# Patient Record
Sex: MALE | URBAN - METROPOLITAN AREA
[De-identification: names, ages, dates, MRNs, and addresses within clinical notes are randomized per-mention and may not be internally consistent; named-entity substitution may affect disease eponyms.]

---

## 2018-03-01 ENCOUNTER — OUTPATIENT (OUTPATIENT)
Dept: OUTPATIENT SERVICES | Facility: HOSPITAL | Age: 33
LOS: 1 days | End: 2018-03-01
Payer: MEDICAID

## 2018-03-01 PROCEDURE — G9001: CPT

## 2018-03-06 DIAGNOSIS — R69 ILLNESS, UNSPECIFIED: ICD-10-CM

## 2021-02-22 ENCOUNTER — EMERGENCY (EMERGENCY)
Facility: HOSPITAL | Age: 36
LOS: 1 days | Discharge: HOME | End: 2021-02-22
Attending: EMERGENCY MEDICINE
Payer: COMMERCIAL

## 2021-02-22 VITALS
DIASTOLIC BLOOD PRESSURE: 73 MMHG | HEART RATE: 130 BPM | TEMPERATURE: 97 F | RESPIRATION RATE: 16 BRPM | OXYGEN SATURATION: 93 % | SYSTOLIC BLOOD PRESSURE: 124 MMHG

## 2021-02-22 PROCEDURE — 99053 MED SERV 10PM-8AM 24 HR FAC: CPT

## 2021-02-22 PROCEDURE — 29105 APPLICATION LONG ARM SPLINT: CPT | Mod: LT

## 2021-02-22 PROCEDURE — 99285 EMERGENCY DEPT VISIT HI MDM: CPT | Mod: 25

## 2021-02-23 VITALS
HEART RATE: 105 BPM | TEMPERATURE: 98 F | RESPIRATION RATE: 18 BRPM | DIASTOLIC BLOOD PRESSURE: 83 MMHG | SYSTOLIC BLOOD PRESSURE: 124 MMHG | OXYGEN SATURATION: 96 %

## 2021-02-23 DIAGNOSIS — Z94.0 KIDNEY TRANSPLANT STATUS: Chronic | ICD-10-CM

## 2021-02-23 PROCEDURE — 96372 THER/PROPH/DIAG INJ SC/IM: CPT | Mod: XU

## 2021-02-23 PROCEDURE — 72125 CT NECK SPINE W/O DYE: CPT | Mod: 26

## 2021-02-23 PROCEDURE — 70450 CT HEAD/BRAIN W/O DYE: CPT

## 2021-02-23 PROCEDURE — 72125 CT NECK SPINE W/O DYE: CPT

## 2021-02-23 PROCEDURE — 73080 X-RAY EXAM OF ELBOW: CPT | Mod: 26,LT

## 2021-02-23 PROCEDURE — 71046 X-RAY EXAM CHEST 2 VIEWS: CPT | Mod: 26

## 2021-02-23 PROCEDURE — 73090 X-RAY EXAM OF FOREARM: CPT | Mod: 26,LT

## 2021-02-23 PROCEDURE — 73080 X-RAY EXAM OF ELBOW: CPT

## 2021-02-23 PROCEDURE — 90715 TDAP VACCINE 7 YRS/> IM: CPT

## 2021-02-23 PROCEDURE — 29125 APPL SHORT ARM SPLINT STATIC: CPT | Mod: LT

## 2021-02-23 PROCEDURE — 71046 X-RAY EXAM CHEST 2 VIEWS: CPT

## 2021-02-23 PROCEDURE — 70450 CT HEAD/BRAIN W/O DYE: CPT | Mod: 26

## 2021-02-23 PROCEDURE — 73090 X-RAY EXAM OF FOREARM: CPT

## 2021-02-23 PROCEDURE — 90471 IMMUNIZATION ADMIN: CPT

## 2021-02-23 PROCEDURE — 99284 EMERGENCY DEPT VISIT MOD MDM: CPT | Mod: 25

## 2021-02-23 RX ORDER — KETOROLAC TROMETHAMINE 30 MG/ML
30 SYRINGE (ML) INJECTION ONCE
Refills: 0 | Status: DISCONTINUED | OUTPATIENT
Start: 2021-02-23 | End: 2021-02-23

## 2021-02-23 RX ORDER — TETANUS TOXOID, REDUCED DIPHTHERIA TOXOID AND ACELLULAR PERTUSSIS VACCINE, ADSORBED 5; 2.5; 8; 8; 2.5 [IU]/.5ML; [IU]/.5ML; UG/.5ML; UG/.5ML; UG/.5ML
0.5 SUSPENSION INTRAMUSCULAR ONCE
Refills: 0 | Status: COMPLETED | OUTPATIENT
Start: 2021-02-23 | End: 2021-02-23

## 2021-02-23 RX ORDER — ACETAMINOPHEN 500 MG
650 TABLET ORAL ONCE
Refills: 0 | Status: COMPLETED | OUTPATIENT
Start: 2021-02-23 | End: 2021-02-23

## 2021-02-23 RX ORDER — OXYCODONE AND ACETAMINOPHEN 5; 325 MG/1; MG/1
1 TABLET ORAL ONCE
Refills: 0 | Status: DISCONTINUED | OUTPATIENT
Start: 2021-02-23 | End: 2021-02-23

## 2021-02-23 RX ADMIN — Medication 650 MILLIGRAM(S): at 01:29

## 2021-02-23 RX ADMIN — Medication 30 MILLIGRAM(S): at 01:29

## 2021-02-23 RX ADMIN — TETANUS TOXOID, REDUCED DIPHTHERIA TOXOID AND ACELLULAR PERTUSSIS VACCINE, ADSORBED 0.5 MILLILITER(S): 5; 2.5; 8; 8; 2.5 SUSPENSION INTRAMUSCULAR at 01:29

## 2021-02-23 RX ADMIN — OXYCODONE AND ACETAMINOPHEN 1 TABLET(S): 5; 325 TABLET ORAL at 03:22
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
## 2021-02-23 NOTE — ED PROVIDER NOTE - PATIENT PORTAL LINK FT
You can access the FollowMyHealth Patient Portal offered by [facility] by registering at the following website: [email or web address]. By joining [facility]’s FollowMyHealth portal, you will also be able to view your health information using other applications (apps) compatible with our system.

## 2021-02-23 NOTE — ED PROCEDURE NOTE - CPROC ED POST PROC CARE GUIDE1
Verbal/written post procedure instructions were given to patient/caregiver./Instructed patient/caregiver to follow-up with primary care physician./Keep the cast/splint/dressing clean and dry. Verbal/written post procedure instructions were given to patient/caregiver./Instructed patient/caregiver to follow-up with primary care physician./Elevate the injured extremity as instructed./Keep the cast/splint/dressing clean and dry.

## 2021-02-23 NOTE — ED PROCEDURE NOTE - CPROC ED POST PROC CARE GUIDE1
Verbal/written post procedure instructions were given to patient/caregiver./Instructed patient/caregiver to follow-up with primary care physician. Verbal/written post procedure instructions were given to patient/caregiver./Instructed patient/caregiver to follow-up with primary care physician./Instructed patient/caregiver regarding signs and symptoms of infection./Elevate the injured extremity as instructed./Keep the cast/splint/dressing clean and dry.

## 2021-02-23 NOTE — ED PROVIDER NOTE - NSFOLLOWUPCLINICS_GEN_ALL_ED_FT
[facility]  Orthopedics  [address] [place] [address]  Phone: [phone]  Fax: [phone]  Follow Up Time:

## 2021-02-23 NOTE — ED PROVIDER NOTE - NSFOLLOWUPINSTRUCTIONS_ED_ALL_ED_FT
Please follow up with the orthopedic doctor as soon as possible. Please return to the Emergency Department for worsening signs or symptoms.      Ulnar Fracture       An ulnar fracture is a break in the ulna bone. The ulna is a bone in the forearm, on the same side as the little finger. The forearm is the part of the arm that is between the elbow and the wrist. It is made up of two bones: the radius and the ulna. You can feel the ulna on the outside of the wrist and at the point of the elbow.    An ulnar fracture can happen near the wrist, near the elbow, or in the middle of the forearm. In many cases of ulnar fracture, the radius is also fractured.      What are the causes?  This condition is usually caused by a direct hit or stress to the forearm. This may result from:  •An accident, such as a car or bike accident.      •Falling with the arm outstretched.        What increases the risk?  You may be more likely to fracture your ulna if you:  •Play contact sports.      •Have a condition that causes your bones to become thin and brittle (osteoporosis).        What are the signs or symptoms?  Signs and symptoms may include:  •Pain immediately after the injury.      •An abnormal bend or bump in the arm (deformity).      •Swelling.      •Bruising.      •Numbness or weakness in your hand.      •Inability to turn your hand from side to side.        How is this diagnosed?  This condition may be diagnosed based on:  •Your symptoms and medical history.      •A physical exam.      •An X-ray.        How is this treated?  Treatment depends on how severe your fracture is, where it is, and how the pieces of the broken bone line up with each other (alignment).  •The first step in treatment may be for you to wear a temporary splint for a few days. After the swelling goes down, you may get a cast, get a different type of splint, or have surgery.      •If your broken bone is in good alignment, you will need to wear a splint or cast for several weeks.    •If your broken bone is not aligned (is displaced), your health care provider will need to align the bone pieces. After alignment, you will need to wear a splint or cast for up to 6 weeks. To align your broken bone, your health care provider may:  •Move the bones back into position without surgery (closed reduction).      •Perform surgery to align the fracture and fix the bone pieces into place with metal screws, plates, or wires (open reduction and internal fixation, ORIF).      •Perform surgery to align the fracture and fix the bone pieces into place with pins that are attached to a stabilizing bar outside your skin (external fixation).      Treatment may also include:  •Having your cast changed after 2–3 weeks.      •Physical therapy.      •Follow-up visits and X-rays to make sure you are healing.        Follow these instructions at home:    If you have a splint:     •Wear it as told by your health care provider. Remove it only as told by your health care provider.      •Loosen the splint if your fingers tingle, become numb, or turn cold and blue.      •Keep the splint clean and dry.      If you have a cast:     • Do not stick anything inside the cast to scratch your skin. Doing that increases your risk for infection.      •Check the skin around the cast every day. Tell your health care provider about any concerns.      •You may put lotion on dry skin around the edges of the cast. Do not put lotion on the skin underneath the cast.      •Keep the cast clean and dry.      Bathing     • Do not take baths, swim, or use a hot tub until your health care provider approves. Ask your health care provider if you may take showers. You may only be allowed to take sponge baths.    •If your splint or cast is not waterproof:  •Do not let it get wet.      •Cover it with a watertight covering when you take a bath or a shower.        Activity     • Do not lift anything with your injured arm.      • Do not use the injured arm to support your body weight until your health care provider says that you can.      •Ask your health care provider what activities are safe for you during recovery, and ask what activities you need to avoid.        Managing pain, stiffness, and swelling    •If directed, put ice on painful areas:  •If you have a removable splint, remove it as told by your health care provider.      •Put ice in a plastic bag.      •Place a towel between your skin and the bag, or between your cast and the bag.      •Leave the ice on for 20 minutes, 2–3 times a day.        •Move your fingers often to avoid stiffness and to lessen swelling.      •Raise (elevate) the injured area above the level of your heart while you are sitting or lying down.      General instructions     • Do not put pressure on any part of the cast or splint until it is fully hardened. This may take several hours.      •Take over-the-counter and prescription medicines only as told by your health care provider.      • Do not drive until your health care provider approves. You should not drive or use heavy machinery while taking prescription pain medicine.      • Do not use any products that contain nicotine or tobacco, such as cigarettes and e-cigarettes. These can delay bone healing. If you need help quitting, ask your health care provider.       •Keep all follow-up visits as told by your health care provider. This is important.        Contact a health care provider if you have:    •Pain that does not get better with medicine.      •Swelling that gets worse.      •A bad smell coming from your cast.        Get help right away if:    •You cannot move your fingers.      •You have severe pain.    •Your fingers or your hand:  •Become numb, cold, or pale.      •Turn a bluish color.          Summary    •An ulnar fracture is a break in the ulnar bone, which is the bone in your forearm that is on the same side as your little finger.      •You may need to wear a splint or a cast for at least several weeks. Sometimes surgery is needed.      •Keep all follow-up visits as told by your health care provider.      This information is not intended to replace advice given to you by your health care provider. Make sure you discuss any questions you have with your health care provider.

## 2021-02-23 NOTE — ED PROCEDURE NOTE - CPROC ED TIME OUT STATEMENT1
“Patient's name, , procedure and correct site were confirmed during the [place] Timeout.”
“Patient's name, , procedure and correct site were confirmed during the [place] Timeout.”

## 2021-02-23 NOTE — ED PROVIDER NOTE - MUSCULOSKELETAL, MLM
Diffuse swelling to the L elbow, large abrasion to the L lateral aspect of the forearm, neurovascularly intact, no C spine tenderness
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
## 2021-03-04 PROBLEM — Z00.00 ENCOUNTER FOR PREVENTIVE HEALTH EXAMINATION: Status: ACTIVE | Noted: 2021-03-04

## 2021-03-05 ENCOUNTER — APPOINTMENT (OUTPATIENT)
Dept: ORTHOPEDIC SURGERY | Facility: CLINIC | Age: 36
End: 2021-03-05
Payer: COMMERCIAL

## 2021-03-05 PROCEDURE — 73080 X-RAY EXAM OF ELBOW: CPT | Mod: LT

## 2021-03-05 PROCEDURE — 99204 OFFICE O/P NEW MOD 45 MIN: CPT

## 2021-03-05 PROCEDURE — 99072 ADDL SUPL MATRL&STAF TM PHE: CPT

## 2021-03-05 NOTE — PHYSICAL EXAM
[de-identified] : Physical Examination\par General: well nourished, in no acute distress, alert and oriented to person, place and time\par Psychiatric: normal mood and affect, no abnormal movements or speech patterns\par Eyes: vision intact - glasses\par Throat: no thyromegaly\par Lymph: no enlarged nodes, no lymphedema in extremity\par Respiratory: no wheezing, no shortness of breath with ambulation\par Cardiac: no cardiac leg swelling, 2+ peripheral pulses\par Neurology: normal gross sensation in extremities to light touch\par Abdomen: soft, non-tender, tympanic, no masses\par \par Musculoskeletal Examination\par Cervical spine		Full painless range of motion and negative Spurling's test\par Limited exam left upper extremity secondary to fracture\par Elbow     			Right			Left\par Appearance\par      Skin 				normal			dialysis access humerus\par      Swelling/Deformity		normal			normal\par  Range of Motion\par      Flexion			160			160&\par      Extension			0			0&\par      Supination			85			85&\par      Pronation			90			90&\par Palpation\par      Radial Head			-			-\par      Lateral Epicondyle		-			-\par      Medial Epicondyle		-			-\par      Olecranon			-			+\par      Triceps Tendon		-			-\par      Biceps Tendon		-			-\par \par Strength Examination\par      Flexion 			5+ / 0			5+ / 0&\par      Extension			5+ / 0			5+ / 0&\par      Supination			5+ / 0			5+ / 0&\par      Pronation			5+ / 0			5+ / 0&\par      				normal			normal\par \par Special Examination\par      Milking Posterolateral		-			-&\par      Chair Rise Posteromedial 	-			-&\par      Ulnar Cubital Tunnel Tinnels	-			-&\par      Sublux Ulnar Nerve		-			-&\par      \par Sensation\par      Axillary			normal			normal\par      LatAntCubBrach 		normal			normal\par      Median 			normal			normal\par      Ulnar 			normal			normal\par      Radial 			normal			normal\par Motor\par      AIN 				normal			normal\par      Ulnar 			normal			normal\par      Radial 			normal			normal\par      PIN 				normal			normal\par Pulses\par      Radial			2+			2+\par  [de-identified] : 3 views of the L elbow (AP, lateral and radial head)\par were ordered, obtained and evaluated by myself today and\par demonstrate:\par The radial head aligns with the capitellum on all views\par no degenerative joint disease\par + Proximal ulna fracture minimally displaced with mild dorsal angulation\par - dislocation \par Otherwise normal osseous bone structure and soft tissue contour\par
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
## 2021-03-05 NOTE — DISCUSSION/SUMMARY
[de-identified] : Left proximal ulna fracture wo evidence of radial head subluxation\par \par Given lack of radial head subluxation do not suspect significant alignment issues on the radius and ulna\par \par Benefit risks and alternatives of surgery discussed at length with the patient\par \par Given him significant medical comorbidities the patient as well as the time since his surgery plus extensive medical clearance necessary discussed risks benefits and alternatives\par \par Patient is unclear if he wants to proceed with surgery, recommend the patient discussed surgical risk with his renal doctor\par \par Continue splint for one more week\par \par Elevate ice\par \par No anti-inflammatories recommended Tylenol\par \par Recommended patient discussed with renal doctor what medications appropriate for him to take for his pain medication\par \par Patient is asking for narcotic medications, given the duration of time since the fracture and patient reported improvement of pain do not recommend narcotic medications at this time\par \par If the patient wishes for surgery I recommend the patient call the office for further discussion\par \par Followup one week

## 2021-03-12 ENCOUNTER — APPOINTMENT (OUTPATIENT)
Dept: ORTHOPEDIC SURGERY | Facility: CLINIC | Age: 36
End: 2021-03-12

## 2021-03-31 ENCOUNTER — APPOINTMENT (OUTPATIENT)
Dept: ORTHOPEDIC SURGERY | Facility: CLINIC | Age: 36
End: 2021-03-31
Payer: COMMERCIAL

## 2021-03-31 PROCEDURE — 99214 OFFICE O/P EST MOD 30 MIN: CPT | Mod: 25

## 2021-03-31 PROCEDURE — 29105 APPLICATION LONG ARM SPLINT: CPT | Mod: LT

## 2021-03-31 PROCEDURE — 99072 ADDL SUPL MATRL&STAF TM PHE: CPT

## 2021-03-31 RX ORDER — MYCOPHENOLATE MOFETIL 500 MG/1
500 TABLET ORAL
Qty: 60 | Refills: 0 | Status: ACTIVE | COMMUNITY
Start: 2021-03-25

## 2021-03-31 RX ORDER — VALACYCLOVIR 500 MG/1
500 TABLET, FILM COATED ORAL
Qty: 30 | Refills: 0 | Status: ACTIVE | COMMUNITY
Start: 2021-02-27

## 2021-03-31 RX ORDER — TACROLIMUS 1 MG/1
1 CAPSULE ORAL
Qty: 90 | Refills: 0 | Status: ACTIVE | COMMUNITY
Start: 2021-03-25

## 2021-03-31 RX ORDER — METOPROLOL SUCCINATE 25 MG/1
25 TABLET, EXTENDED RELEASE ORAL
Qty: 90 | Refills: 0 | Status: ACTIVE | COMMUNITY
Start: 2021-02-27

## 2021-03-31 RX ORDER — SIMVASTATIN 20 MG/1
20 TABLET, FILM COATED ORAL
Qty: 30 | Refills: 0 | Status: ACTIVE | COMMUNITY
Start: 2021-03-25

## 2021-03-31 RX ORDER — LISINOPRIL 2.5 MG/1
2.5 TABLET ORAL
Qty: 90 | Refills: 0 | Status: ACTIVE | COMMUNITY
Start: 2021-02-27

## 2021-03-31 RX ORDER — PREDNISONE 5 MG/1
5 TABLET ORAL
Qty: 13 | Refills: 0 | Status: ACTIVE | COMMUNITY
Start: 2021-03-25

## 2021-03-31 RX ORDER — MAGNESIUM OXIDE 400 MG
400 (241.3 MG) TABLET ORAL
Qty: 30 | Refills: 0 | Status: ACTIVE | COMMUNITY
Start: 2021-03-25

## 2021-03-31 RX ORDER — CINACALCET 60 MG/1
60 TABLET ORAL
Qty: 30 | Refills: 0 | Status: ACTIVE | COMMUNITY
Start: 2021-03-22

## 2021-03-31 NOTE — PHYSICAL EXAM
[de-identified] : Physical Examination\par General: well nourished, in no acute distress, alert and oriented to person, place and time\par Psychiatric: normal mood and affect, no abnormal movements or speech patterns\par Eyes: vision intact - glasses\par Throat: no thyromegaly\par Lymph: no enlarged nodes, no lymphedema in extremity\par Respiratory: no wheezing, no shortness of breath with ambulation\par Cardiac: no cardiac leg swelling, 2+ peripheral pulses\par Neurology: normal gross sensation in extremities to light touch\par Abdomen: soft, non-tender, tympanic, no masses\par \par Musculoskeletal Examination\par Cervical spine		Full painless range of motion and negative Spurling's test\par Limited exam left upper extremity secondary to fracture\par Elbow     			Right			Left\par Appearance\par      Skin 				normal			dialysis access humerus\par      Swelling/Deformity		normal			normal\par  Range of Motion\par      Flexion			160			160&\par      Extension			0			0&\par      Supination			85			85&\par      Pronation			90			90&\par Palpation\par      Radial Head			-			-\par      Lateral Epicondyle		-			-\par      Medial Epicondyle		-			-\par      Olecranon			-			+\par      Triceps Tendon		-			-\par      Biceps Tendon		-			-\par \par Strength Examination\par      Flexion 			5+ / 0			5+ / 0&\par      Extension			5+ / 0			5+ / 0&\par      Supination			5+ / 0			5+ / 0&\par      Pronation			5+ / 0			5+ / 0&\par      				normal			normal\par \par Special Examination\par      Milking Posterolateral		-			-&\par      Chair Rise Posteromedial 	-			-&\par      Ulnar Cubital Tunnel Tinnels	-			-&\par      Sublux Ulnar Nerve		-			-&\par      \par Sensation\par      Axillary			normal			normal\par      LatAntCubBrach 		normal			normal\par      Median 			normal			normal\par      Ulnar 			normal			normal\par      Radial 			normal			normal\par Motor\par      AIN 				normal			normal\par      Ulnar 			normal			normal\par      Radial 			normal			normal\par      PIN 				normal			normal\par Pulses\par      Radial			2+			2+\par  [de-identified] : 3 views of the L elbow (AP, lateral and radial head)\par 3-5-21\par demonstrate:\par The radial head aligns with the capitellum on all views\par no degenerative joint disease\par + Proximal ulna fracture minimally displaced with mild dorsal angulation\par - dislocation \par Otherwise normal osseous bone structure and soft tissue contour\par \par \par 3 views of the L elbow (AP, lateral and radial head)\par were ordered, obtained and evaluated by myself today and\par demonstrate:\par The radial head aligns with the capitellum on all views\par no degenerative joint disease\par Proximal ulna fracture minimally displaced with mild dorsal angulation, unchanged alignment, no significant healing noted\par - dislocation \par Otherwise normal osseous bone structure and soft tissue contour\par

## 2021-03-31 NOTE — DISCUSSION/SUMMARY
[de-identified] : Left proximal ulna fracture wo evidence of radial head subluxation\par \par Given lack of radial head subluxation do not suspect significant alignment issues on the radius and ulna\par \par Benefit risks and alternatives of surgery discussed at length with the patient\par \par Given him significant medical comorbidities the patient as well as the time since his surgery plus extensive medical clearance necessary discussed risks benefits and alternatives\par \par Patient wants non-op management\par \par placed into a well padded sugar tong w posterior extension\par \par Elevate ice\par \par No anti-inflammatories recommended Tylenol\par \par Followup 1-3 week
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
## 2021-04-28 ENCOUNTER — APPOINTMENT (OUTPATIENT)
Dept: ORTHOPEDIC SURGERY | Facility: CLINIC | Age: 36
End: 2021-04-28
Payer: COMMERCIAL

## 2021-04-28 VITALS — TEMPERATURE: 96.6 F

## 2021-04-28 DIAGNOSIS — S52.092A OTHER FRACTURE OF UPPER END OF LEFT ULNA, INITIAL ENCOUNTER FOR CLOSED FRACTURE: ICD-10-CM

## 2021-04-28 PROCEDURE — 73080 X-RAY EXAM OF ELBOW: CPT | Mod: LT

## 2021-04-28 PROCEDURE — 99214 OFFICE O/P EST MOD 30 MIN: CPT

## 2021-04-28 PROCEDURE — 99072 ADDL SUPL MATRL&STAF TM PHE: CPT
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
## 2021-04-28 NOTE — DISCUSSION/SUMMARY
[de-identified] : Left proximal ulna fracture wo evidence of radial head subluxation\par \par Given lack of radial head subluxation do not suspect significant alignment issues on the radius and ulna\par \par Benefit risks and alternatives of surgery discussed at length with the patient\par \par Given him significant medical comorbidities the patient as well as the time since his surgery plus extensive medical clearance necessary discussed risks benefits and alternatives\par \par Patient wants non-op management\par \par PT\par \par unable to give nsaids due to renal transplant\par \par NWB LUE - no push off or lifitng objects\par \par Followup 4 week

## 2021-05-19 ENCOUNTER — APPOINTMENT (OUTPATIENT)
Dept: ORTHOPEDIC SURGERY | Facility: CLINIC | Age: 36
End: 2021-05-19

## 2024-07-15 ENCOUNTER — NON-APPOINTMENT (OUTPATIENT)
Age: 39
End: 2024-07-15